# Patient Record
Sex: FEMALE | Race: WHITE
[De-identification: names, ages, dates, MRNs, and addresses within clinical notes are randomized per-mention and may not be internally consistent; named-entity substitution may affect disease eponyms.]

---

## 2017-05-18 ENCOUNTER — HOSPITAL ENCOUNTER (EMERGENCY)
Dept: HOSPITAL 74 - JER | Age: 22
Discharge: HOME | End: 2017-05-18
Payer: COMMERCIAL

## 2017-05-18 VITALS — DIASTOLIC BLOOD PRESSURE: 82 MMHG | HEART RATE: 77 BPM | TEMPERATURE: 98.7 F | SYSTOLIC BLOOD PRESSURE: 144 MMHG

## 2017-05-18 VITALS — BODY MASS INDEX: 26.5 KG/M2

## 2017-05-18 DIAGNOSIS — K21.9: Primary | ICD-10-CM

## 2017-05-18 LAB
ANION GAP SERPL CALC-SCNC: 11 MMOL/L (ref 8–16)
BASOPHILS # BLD: 0.5 % (ref 0–2)
CALCIUM SERPL-MCNC: 9.4 MG/DL (ref 8.5–10.1)
CO2 SERPL-SCNC: 24 MMOL/L (ref 21–32)
CREAT SERPL-MCNC: 0.8 MG/DL (ref 0.55–1.02)
D DIMER PPP FEU-MCNC: < 200 NG/ML
DEPRECATED RDW RBC AUTO: 13 % (ref 11.6–15.6)
EOSINOPHIL # BLD: 0.5 % (ref 0–4.5)
GLUCOSE SERPL-MCNC: 93 MG/DL (ref 74–106)
INR BLD: 1 (ref 0.82–1.09)
MCH RBC QN AUTO: 25.9 PG (ref 25.7–33.7)
MCHC RBC AUTO-ENTMCNC: 33 G/DL (ref 32–36)
MCV RBC: 78.4 FL (ref 80–96)
NEUTROPHILS # BLD: 60.6 % (ref 42.8–82.8)
PLATELET # BLD AUTO: 237 K/MM3 (ref 134–434)
PMV BLD: 9.5 FL (ref 7.5–11.1)
PT PNL PPP: 11 SEC (ref 9.98–11.88)
TROPONIN I SERPL-MCNC: < 0.02 NG/ML (ref 0–0.05)
WBC # BLD AUTO: 6.6 K/MM3 (ref 4–10)

## 2017-05-18 PROCEDURE — 3E033GC INTRODUCTION OF OTHER THERAPEUTIC SUBSTANCE INTO PERIPHERAL VEIN, PERCUTANEOUS APPROACH: ICD-10-PCS

## 2017-05-18 NOTE — PDOC
History of Present Illness





- General


History Source: Patient





<Dave Conner - Last Filed: 17 23:28>





- General


History Source: Patient


Exam Limitations: No Limitations





- History of Present Illness


Initial Comments: 





17 21:56


The patient is a 21 year old female, LMP 2 weeks ago,a with no significant past 

medical history, who presents to the ER with intermittent non-radiating chest 

pain for three days. Patient says the pain is worse today. Patient localizes 

the chest pain to the middle near the sternal area. She rates the pain at 5/10. 

She says swallowing or deep inhalation exacerbates the pain. Patient also feels 

the pain while pressing the sternal area. Patient reports she has associated 

nausea. She states she took Advil without relief of symptoms. Patients last 

Advil was four hours ago. She states she had similar symptoms one year ago that 

resolved on its own. She also reports feeling slight dizziness yesterday. 

Patient is on oral contraceptives. She says she constantly has headaches for 

which she takes Advil. 





Denies vomiting, diarrhea, abdominal pain


Denies heavy lifting 


Denies surgeries, history of cardiac problems


Denies leg edema or calf pain





Allergies: NKDA





PCP: Dr. Dante Holly 








<Carmen Reed - Last Filed: 17 06:00>





- General


Chief Complaint: Chest Pain


Stated Complaint: CHEST PAIN


Time Seen by Provider: 17 21:36





Past History





- Reproductive History


 (#): 1


Para: 0


Therapeutic (s) & number: Yes (1)





- Immunization History


Immunization Up to Date: Yes





- Psycho/Social/Smoking Cessation Hx


Anxiety: No


Suicidal Ideation: No


Smoking Status: No


Smoking History: Never smoked


Have you smoked in the past 12 months: No


Number of Cigarettes Smoked Daily: 0


Hx Alcohol Use: No


Drug/Substance Use Hx: No


Substance Use Type: None





<Dave Conner - Last Filed: 17 23:28>





<Carmen Reed - Last Filed: 17 06:00>





- Past Medical History


Allergies/Adverse Reactions: 


 Allergies











Allergy/AdvReac Type Severity Reaction Status Date / Time


 


No Known Allergies Allergy   Verified 17 20:31











Home Medications: 


Ambulatory Orders





Norethindrone-E.estradiol-Iron [Junel Fe 1 mg-20 Mcg Tablet] 1 tab PO DAILY  


Pantoprazole Sodium [Protonix] 40 mg PO DAILY #30 tablet. 17 











**Review of Systems





- Review of Systems


Comments:: 





17 22:00


CONSTITUTIONAL:


Absent: fever, no chills, no fatigue


EYES:


Absent: visual changes


ENT:


Absent: ear pain, no sore throat


CARDIOVASCULAR:


Absent: chest pain, no palpitations


CHEST WALL: Tenderness of palpation to the sternum


RESPIRATORY:


Absent: cough, no SOB


GI:


Absent: abdominal pain, no nausea, no vomiting, no constipation, no diarrhea


GENITOURINARY:


Absent: dysuria, no frequency, no hematuria


MUSCULOSKELETAL:


Absent: back pain, no arthralgia, no myalgia


SKIN:


Absent: rash


NEURO:


Absent: headache





<Carmen Reed - Last Filed: 17 06:00>





*Physical Exam





- Vital Signs


 Last Vital Signs











Temp Pulse Resp BP Pulse Ox


 


 98.7 F   77   18   144/82   100 


 


 17 20:31  17 20:31  17 20:31  17 20:31  17 20:31














<Dave Conner - Last Filed: 17 23:28>





- Vital Signs


 Last Vital Signs











Temp Pulse Resp BP Pulse Ox


 


 98.7 F   77   18   144/82   100 


 


 17 20:31  17 20:31  17 20:31  17 20:31  17 20:31














<Carmen Reed - Last Filed: 17 06:00>





**Heart Score/ECG Review





- ECG Intrepretation


Comment:: 





17 05:59


EKG read and reviewed by Dr. Conner: Normal sinus rhythm. Possible left 

atrial enlargement. Vent rate : 85 bpm, NE interval 164 ms, QRS duration: 80 ms





<Carmen Reed - Last Filed: 17 06:00>





ED Treatment Course





- LABORATORY


CBC & Chemistry Diagram: 


 17 22:05





 17 22:05





<Dave Conner - Last Filed: 17 23:28>





- LABORATORY


CBC & Chemistry Diagram: 


 17 22:05





 17 22:05





<Carmen Reed - Last Filed: 17 06:00>





Medical Decision Making





- Medical Decision Making





17 23:29


Dr. Conner: The scribe's documentation has been prepared under my direction 

and personally reviewed by me in its entirery. I confirm that the note above 

accurately reflects all work, treatment, procedures, and medical decision 

making performed by me.





Pt feels better after IV protonix.  Pt to follow up with her pcp.  Advised to 

avoid excess, caffeine or spicy food.  





<Dave Conner - Last Filed: 17 23:28>





*DC/Admit/Observation/Transfer





- Discharge Dispostion


Admit: No





<Dave Conner - Last Filed: 17 23:28>





- Attestations


Scribe Attestion: 





17 22:17





Documentation prepared by Carmen Reed, acting as medical scribe for Dave Conner DO.





<Carmen Reed - Last Filed: 17 06:00>


Diagnosis at time of Disposition: 


GERD (gastroesophageal reflux disease)


Qualifiers:


 Esophagitis presence: esophagitis presence not specified Qualified Code(s): 

K21.9 - Gastro-esophageal reflux disease without esophagitis





- Discharge Dispostion


Disposition: HOME


Condition at time of disposition: Stable





- Prescriptions


Prescriptions: 


Pantoprazole Sodium [Protonix] 40 mg PO DAILY #30 tablet.dr





- Referrals


Referrals: 


Dante Holly MD [Primary Care Provider] - 





- Patient Instructions


Printed Discharge Instructions:  GERD Diet, DI for Gastroesophageal Reflux 

Disease (GERD)


Additional Instructions: 


avoid excessive caffeine, or greasy , spicy food.    Take medication as 

directed.

## 2017-05-19 NOTE — EKG
Test Reason : 

Blood Pressure : ***/*** mmHG

Vent. Rate : 085 BPM     Atrial Rate : 085 BPM

   P-R Int : 164 ms          QRS Dur : 080 ms

    QT Int : 366 ms       P-R-T Axes : 061 072 035 degrees

   QTc Int : 435 ms

 

NORMAL SINUS RHYTHM

POSSIBLE LEFT ATRIAL ENLARGEMENT

BORDERLINE ECG

WHEN COMPARED WITH ECG OF 10-SEP-2013 07:29,

PREVIOUS ECG IS PRESENT

Confirmed by LADONNA DEVI MD ([id number]) on 5/19/2017 4:15:23 PM

 

Referred By:             Confirmed By:ALDONNA DEVI MD

## 2019-01-03 ENCOUNTER — HOSPITAL ENCOUNTER (EMERGENCY)
Dept: HOSPITAL 74 - JERFT | Age: 24
Discharge: HOME | End: 2019-01-03
Payer: SELF-PAY

## 2019-01-03 VITALS — SYSTOLIC BLOOD PRESSURE: 121 MMHG | DIASTOLIC BLOOD PRESSURE: 68 MMHG | HEART RATE: 68 BPM | TEMPERATURE: 98.1 F

## 2019-01-03 VITALS — BODY MASS INDEX: 25.7 KG/M2

## 2019-01-03 DIAGNOSIS — J02.9: Primary | ICD-10-CM

## 2019-01-03 NOTE — PDOC
History of Present Illness





- General


Chief Complaint: Sore Throat


Stated Complaint: COLD SYMPTOMS


Time Seen by Provider: 19 14:10


History Source: Patient


Exam Limitations: Clinical Condition





- History of Present Illness


Initial Comments: 





19 14:57


Patient with no significant past medication present with complaint of nasal 

congestion and sore throat after returning from vacation last night. Patient 

denies fever, chills. Patient reported she could not go to work today due to 

sore throat. Patient denies any other symptoms.


Timing/Duration: 24 hours





Past History





- Past Medical History


Allergies/Adverse Reactions: 


 Allergies











Allergy/AdvReac Type Severity Reaction Status Date / Time


 


No Known Allergies Allergy   Verified 18 11:00











Home Medications: 


Ambulatory Orders





Ipratropium Bromide 2 spray NS BID PRN #1 spray 19 








COPD: No





- Reproductive History


 (#): 1


Para: 0


Therapeutic (s) & number: Yes (1)





- Immunization History


Immunization Up to Date: Yes





- Suicide/Smoking/Psychosocial Hx


Smoking Status: No


Smoking History: Never smoked


Have you smoked in the past 12 months: No


Number of Cigarettes Smoked Daily: 0


Information on smoking cessation initiated: No


Hx Alcohol Use: No


Drug/Substance Use Hx: No


Substance Use Type: None





**Review of Systems





- Review of Systems


Able to Perform ROS?: Yes


Is the patient limited English proficient: No


Constitutional: No: Chills, Fever, Malaise


HEENTM: Yes: Symptoms Reported, See HPI, Throat Pain.  No: Eye Pain, Blurred 

Vision, Tearing, Recent change in vision, Double Vision, Cataracts, Ear Pain, 

Ocular Prothesis, Ear Discharge, Nose Pain, Nose Congestion, Tinnitus, Nose 

Bleeding, Hearing Loss, Throat Swelling, Mouth Pain, Dental Problems, 

Difficulty Swallowing, Mouth Swelling, Other


Respiratory: No: Symptoms reported, See HPI, Cough, Orthopnea, Shortness of 

Breath, SOB with Exertion, SOB at Rest, Stridor, Wheezing, Productive cough, 

Hemoptysis, Other


Cardiac (ROS): No: Symptoms Reported, See HPI, Chest Pain, Edema, Irregular 

Heart Rate, Lightheadedness, Palpitations, Syncope, Chest Tightness, Other


ABD/GI: No: Nausea, Vomiting


All Other Systems: Reviewed and Negative





*Physical Exam





- Vital Signs


 Last Vital Signs











Temp Pulse Resp BP Pulse Ox


 


 98.1 F   68   16   121/68   99 


 


 01/03/19 12:29  19 12:29  19 12:29  19 12:29  19 12:29














- Physical Exam


Comments: 





19 14:58


GENERAL:


Well developed, well nourished. Awake and alert. No acute distress.


HEENT:  Normocephalic, atraumatic. PERRLA, EOMI. No conjunctival pallor. Sclera 

are non-icteric. Moist mucous membranes. Oropharynx is clear.


NECK: 


Supple. Full ROM. 


CARDIOVASCULAR:


Regular rate and rhythm. No murmurs, rubs, or gallops. Distal pulses are 2+ and 

symmetric. 


PULMONARY: 


No evidence of respiratory distress. Lungs clear to auscultation bilaterally. 

No wheezing, rales or rhonchi.


ABDOMINAL:


Soft. Non-tender. Non-distended. No rebound or guarding. No organomegaly. 

Normoactive bowel sounds. 


MUSCULOSKELETAL 


Normal range of motion at all joints. 


EXTREMITIES: 


No cyanosis. No clubbing. No edema. No calf tenderness.


SKIN: 


Warm and dry. Normal capillary refill. No rashes. No jaundice. 


NEUROLOGICAL: 


Alert, awake, appropriate.  Gait is normal without ataxia.


PSYCHIATRIC: 


Cooperative. Good eye contact. Appropriate mood 


General Appearance: Yes: Nourished, Appropriately Dressed.  No: Apparent 

Distress





Moderate Sedation





- Procedure Monitoring


Vital Signs: 


Procedure Monitoring Vital Signs











Temperature  98.1 F   19 12:29


 


Pulse Rate  68   19 12:29


 


Respiratory Rate  16   19 12:29


 


Blood Pressure  121/68   19 12:29


 


O2 Sat by Pulse Oximetry (%)  99   19 12:29











Medical Decision Making





- Medical Decision Making





19 14:59


patient with no significant past medication present with complaint of nasal 

congestion and sore throat after returning from vacation last night. Patient 

denies fever, chills. Patient reported she could not go to work today due to 

sore throat.


Clinical exam unremarkable. Rapid strep tests ordered. Patient will be 

discharged home to take Tylenol Motrin as needed for pain if negative strep 

tests with PCP follow-up.


19 15:20


rapid strep negative. Patient stable for discharge





*DC/Admit/Observation/Transfer


Diagnosis at time of Disposition: 


 Nasal congestion





Pharyngitis


Qualifiers:


 Pharyngitis/tonsillitis etiology: unspecified etiology Qualified Code(s): 

J02.9 - Acute pharyngitis, unspecified








- Discharge Dispostion


Disposition: HOME


Condition at time of disposition: Stable


Decision to Admit order: No





- Prescriptions


Prescriptions: 


Ipratropium Bromide 2 spray NS BID PRN #1 spray


 PRN Reason: nasal congestion





- Referrals


Referrals: 


Harriet Gonzalez MD [Primary Care Provider] - 





- Patient Instructions


Printed Discharge Instructions:  DI for Viral Pharyngitis


Additional Instructions: 


Your strep test was negative. Increase fluid intake. Use prescribed nasal spray 

as needed for nasal congestion. Follow-up with primary care.





- Post Discharge Activity


Forms/Work/School Notes:  Back to Work

## 2020-08-06 ENCOUNTER — HOSPITAL ENCOUNTER (EMERGENCY)
Dept: HOSPITAL 74 - JERFT | Age: 25
Discharge: HOME | End: 2020-08-06
Payer: COMMERCIAL

## 2020-08-06 VITALS — HEART RATE: 74 BPM | DIASTOLIC BLOOD PRESSURE: 90 MMHG | SYSTOLIC BLOOD PRESSURE: 134 MMHG | TEMPERATURE: 97.9 F

## 2020-08-06 VITALS — BODY MASS INDEX: 28 KG/M2

## 2020-08-06 DIAGNOSIS — M54.5: Primary | ICD-10-CM

## 2020-08-06 DIAGNOSIS — K59.09: ICD-10-CM

## 2020-08-06 LAB
APPEARANCE UR: CLEAR
BILIRUB UR STRIP.AUTO-MCNC: NEGATIVE MG/DL
COLOR UR: YELLOW
KETONES UR QL STRIP: NEGATIVE
LEUKOCYTE ESTERASE UR QL STRIP.AUTO: NEGATIVE
NITRITE UR QL STRIP: NEGATIVE
PH UR: 6 [PH] (ref 5–8)
PROT UR QL STRIP: NEGATIVE
PROT UR QL STRIP: NEGATIVE
SP GR UR: 1.01 (ref 1.01–1.03)
UROBILINOGEN UR STRIP-MCNC: 0.2 MG/DL (ref 0.2–1)

## 2020-08-06 NOTE — PDOC
History of Present Illness





- General


Chief Complaint: Back Pain


Stated Complaint: BACK PAIN- UNSPECIFIED


Time Seen by Provider: 20 18:36


History Source: Patient


Exam Limitations: No Limitations





- History of Present Illness


Initial Comments: 





20 18:44


Patient is a 25-year-old female who presents to the ED with diffuse low back 

pain that has been worsening for the last 2 weeks.  She has been taking Tylenol 

and ibuprofen with little relief.  She denies any numbness or tingling down her 

extremities.  She denies any urinary or fecal incontinence or retention.  She 

denies any saddle anesthesia.  The patient states that the pain has gotten worse

over the last 2 weeks.  She is unable to recall what caused the pain but states 

she has not had any injuries and was not lifting anything heavy.  She denies any

past medical history or allergies to medications.  The patient denies any 

dysuria, hematuria, frequency, urgency.








Past History





- Medical History


Allergies/Adverse Reactions: 


                                    Allergies











Allergy/AdvReac Type Severity Reaction Status Date / Time


 


No Known Allergies Allergy   Verified 18 11:00











Home Medications: 


Ambulatory Orders





Ipratropium Bromide 2 spray NS BID PRN #1 spray 19 


Docusate Sodium [Colace] 100 mg PO BID #14 capsule 20 


Polyethylene Glycol 3350 [Miralax (For Bowel Prep) -] 17 gm PO DAILY #1 bottle 

20 








COPD: No





- Reproductive History


Is Patient Pregnant Now?: No


 (#): 1


Para: 0


Therapeutic (s) & number: Yes (1)





- Immunization History


Immunization Up to Date: Yes





- Psycho-Social/Smoking History


Smoking Status: No


Smoking History: Never smoked


Have you smoked in the past 12 months: No


Number of Cigarettes Smoked Daily: 0


Information on smoking cessation initiated: No





- Substance Abuse Hx (Audit-C & DAST Scrn)


How often the patient has a drink containing alcohol: Never


Score: In Men: 4 or > Positive; In Women: 3 or > Positive: 0


Screen Result (Pos requires Nsg. Audit-10AR): Negative


In the last yr the pt used illegal drug/Rx for NonMed reason: No


Score:  Yes response is considered Positive: 0


Screen Result (Positive result requires Nsg. DAST-10): Negative





**Review of Systems





- Review of Systems


Comments:: 





20 18:46


- Review of Systems


Able to Perform ROS?: Yes


Constitutional: No: Fever, Chills, Loss of Appetite, Night Sweats, Weakness


HEENTM: No: Eye Pain, Vision changes, Ear Pain, Throat Pain, Throat Swelling, 

Mouth Pain, Difficulty Swallowing


Respiratory: No: Cough, Shortness of Breath, Wheezing, Sputum Production


Cardiac (ROS): No: Chest Pain, Chest Tightness, Palpitations, Irregular Heart 

Beat, Edema


ABD/GI: No: Nausea, Vomiting, Abdominal Pain, Diarrhea


: No Dysuria, No Hematuria, No Frequency, No Urgency


Musculoskeletal: No: Muscle Pain, Joint Pain, Muscle Weakness, Neck Pain; 

positive: Low back pain


Integumentary: No: Lesions, Rash


Neurological: No: Headache, Numbness, Tingling, Weakness, Speech Difficulties





*Physical Exam





- Vital Signs


                                Last Vital Signs











Temp Pulse Resp BP Pulse Ox


 


 97.9 F   74   17   134/90   100 


 


 20 18:15  20 18:15  20 18:15  20 18:15  20 18:15














- Physical Exam





20 18:46


- Physical Exam


General Appearance:  Nourished, Appropriately Dressed, No Distress


HEENT: EOMI, Normal Voice, Hearing Grossly Normal


Neck: Supple, No Lymphadenopathy (R), No Lymphadenopathy (L), No Rigidity, No 

Decreased range of motion


Respiratory/Chest: Lungs Clear, Normal Breath Sounds.  No Respiratory Distress, 

No Accessory Muscle Use


Cardiovascular: Regular Rhythm, Regular Rate, S1, S2


Gastrointestinal/Abdominal: Normal Bowel Sounds, Soft.  Non-tender, No Guarding,

No Rebound, No Rigidity; no CVA tenderness bilaterally.


Musculoskeletal: Normal Inspection.  No Decreased Range of Motion; no midline 

lumbar tenderness to palpation.  No reproducible paraspinal tenderness to 

palpation.  Strength 5/5 bilateral lower extremities.  Sensation intact to light

touch to the bilateral lower extremities.  EHL intact bilaterally.  Sensation 

intact distally.  Brisk capillary refill distally.  Straight leg raise bilateral

elicits pain in the low back. 


Extremity: Normal Capillary Refill, Normal Inspection


Integumentary:  Normal Color, Dry.  No Rash


Neurologic: CNs II-XII NML intact, Fully Oriented, Alert, Normal Mood/Affect, 

Normal Response














ED Treatment Course





- ADDITIONAL ORDERS


Additional order review: 





08/06/20 20:08


                                Laboratory Tests











  20





  18:40


 


Urine Color  Yellow


 


Urine Appearance  Clear


 


Urine pH  6.0


 


Ur Specific Gravity  1.007 L


 


Urine Protein  Negative


 


Urine Glucose (UA)  Negative


 


Urine Ketones  Negative


 


Urine Blood  Negative


 


Urine Nitrite  Negative


 


Urine Bilirubin  Negative


 


Urine Urobilinogen  0.2


 


Ur Leukocyte Esterase  Negative


 


Urine HCG, Qual  Negative














- RADIOLOGY


Radiology Studies Ordered: 














 Category Date Time Status


 


 SPINE-LUMBAR ONLY [RAD] Stat Radiology  20 18:40 Ordered














Medical Decision Making





- Medical Decision Making





20 18:50


Assessment: Patient is a 24-year-old female with lumbar back pain that has been 

worsening over the last 2 weeks.





Plan:


-UA, urine culture, urine pregnancy


-Lumbar x-ray ordered


-We will hold on analgesia until after pregnancy test is resulted


-Will reassess





20 20:08


The patient has been made aware that her labs do not show any evidence of 

infection.  Her x-ray shows that she has a significant amount of constipation 

and retained stool.  The patient will be given a prescription for Colace and 

MiraLAX.  She should follow-up with her primary doctor within 1 to 2 days for 

repeat evaluation.  Upon further discussion the patient does admit to having 

significant trouble with constipation and states she does not recall the last 

time she had a bowel movement.  She states she has a lot of trouble with bowel 

movements and her stools are very small and very hard.





Discharge





- Discharge Information


Problems reviewed: Yes


Clinical Impression/Diagnosis: 


Low back pain


Qualifiers:


 Chronicity: acute Back pain laterality: bilateral Sciatica presence: without 

sciatica Qualified Code(s): M54.5 - Low back pain





Constipation


Qualifiers:


 Constipation type: other constipation type Qualified Code(s): K59.09 - Other 

constipation





Condition: Stable


Disposition: HOME





- Additional Discharge Information


Prescriptions: 


Docusate Sodium [Colace] 100 mg PO BID #14 capsule


Polyethylene Glycol 3350 [Miralax (For Bowel Prep) -] 17 gm PO DAILY #1 bottle





- Follow up/Referral


Referrals: 


Yobany Ley MD [Primary Care Provider] - 2 Days





- Patient Discharge Instructions


Patient Printed Discharge Instructions:  DI for Constipation, DI for Low Back 

Pain


Additional Instructions: 


Increase your water intake.  Be sure to take the medications as prescribed for 

constipation.  Follow-up with your primary doctor within 1 to 2 days for repeat 

evaluation.





- Post Discharge Activity


Work/Back to School Note:  Back to Work

## 2024-11-25 ENCOUNTER — OFFICE (OUTPATIENT)
Dept: URBAN - METROPOLITAN AREA CLINIC 110 | Facility: CLINIC | Age: 29
Setting detail: OPHTHALMOLOGY
End: 2024-11-25

## 2024-11-25 DIAGNOSIS — H50.05: ICD-10-CM

## 2024-11-25 PROCEDURE — N/S NO SHOW: Performed by: OPHTHALMOLOGY

## 2024-11-25 ASSESSMENT — VISUAL ACUITY
OD_BCVA: 20/
OS_BCVA: 20/